# Patient Record
Sex: MALE | Race: BLACK OR AFRICAN AMERICAN | HISPANIC OR LATINO | Employment: UNEMPLOYED | URBAN - METROPOLITAN AREA
[De-identification: names, ages, dates, MRNs, and addresses within clinical notes are randomized per-mention and may not be internally consistent; named-entity substitution may affect disease eponyms.]

---

## 2023-07-15 ENCOUNTER — OFFICE VISIT (OUTPATIENT)
Dept: URGENT CARE | Age: 23
End: 2023-07-15
Payer: COMMERCIAL

## 2023-07-15 VITALS
HEART RATE: 72 BPM | TEMPERATURE: 97 F | OXYGEN SATURATION: 97 % | WEIGHT: 275.2 LBS | RESPIRATION RATE: 18 BRPM | SYSTOLIC BLOOD PRESSURE: 134 MMHG | BODY MASS INDEX: 37.27 KG/M2 | HEIGHT: 72 IN | DIASTOLIC BLOOD PRESSURE: 82 MMHG

## 2023-07-15 DIAGNOSIS — Z02.4 DRIVER'S PERMIT PE (PHYSICAL EXAMINATION): Primary | ICD-10-CM

## 2023-07-15 NOTE — PATIENT INSTRUCTIONS
Cleared for driving permit physical today, all paper work was filled out, signed and copied. Original papers given back to pt today.  Pts ID was verified by me at time of exam.

## 2023-07-15 NOTE — PROGRESS NOTES
NAME: Eva Morton is a 21 y.o. male  : 2000    MRN: [id number]    /82 (BP Location: Right arm, Patient Position: Sitting)   Pulse 72   Temp (!) 97 °F (36.1 °C) (Tympanic)   Resp 18   Ht 6' (1.829 m)   Wt 125 kg (275 lb 3.2 oz)   SpO2 97%   BMI 37.32 kg/m²     1:05 PM    Assessment and Plan   's permit PE (physical examination) [Z02.4]  1. 's permit PE (physical examination)            Lola Marquez was seen today for annual exam.    Diagnoses and all orders for this visit:    's permit PE (physical examination)        Patient Instructions   Patient Instructions   Cleared for driving permit physical today, all paper work was filled out, signed and copied. Original papers given back to pt today. Pts ID was verified by me at time of exam.         Proceed to the nearest ER if symptoms worsen, Follow up with your PCP  Continue to social distance, wash your hands, and wear your masks. Please continue to follow the CDC. gov guidelines daily for they are subject to change on COVID-19    Chief Complaint     Chief Complaint   Patient presents with   • Annual Exam     Drivers Permit Physical.  No complaints. History of Present Illness     69-year-old male here today for 's permit physical.  Patient is [place]-speaking mainly. Patient denies having any chronic or acute illnesses denies taking any prescription medications from a doctor or provider, denies taking any medications over-the-counter or from any herbal medications. Patient denies any history of suicidal ideation or homicidal ideation and denies any history of anxiety and depression. Patient also denies drinking alcohol smoking or doing drugs. Review of Systems   Review of Systems   Constitutional: Negative. HENT: Negative. Eyes: Negative. Respiratory: Negative. Cardiovascular: Negative. Gastrointestinal: Negative. Endocrine: Negative. Genitourinary: Negative.     Musculoskeletal: Negative. Skin: Negative. Allergic/Immunologic: Negative. Neurological: Negative. Hematological: Negative. Psychiatric/Behavioral: Negative. All other systems reviewed and are negative. Current Medications     No current outpatient medications on file. Current Allergies     Allergies as of 07/15/2023   • (No Known Allergies)              History reviewed. No pertinent past medical history. History reviewed. No pertinent surgical history. History reviewed. No pertinent family history. Medications have been verified. The following portions of the patient's history were reviewed and updated as appropriate: allergies, current medications, past family history, past medical history, past social history, past surgical history and problem list.    Objective   /82 (BP Location: Right arm, Patient Position: Sitting)   Pulse 72   Temp (!) 97 °F (36.1 °C) (Tympanic)   Resp 18   Ht 6' (1.829 m)   Wt 125 kg (275 lb 3.2 oz)   SpO2 97%   BMI 37.32 kg/m²      Physical Exam     Physical Exam  Constitutional:       General: He is not in acute distress. Appearance: Normal appearance. He is well-developed. HENT:      Head: Normocephalic. Right Ear: Hearing, tympanic membrane, ear canal and external ear normal.      Left Ear: Hearing, tympanic membrane, ear canal and external ear normal.      Nose: Nose normal.      Mouth/Throat:      Pharynx: Uvula midline. Tonsils: No tonsillar exudate. 0 on the right. 0 on the left. Eyes:      General: Lids are normal.      Conjunctiva/sclera: Conjunctivae normal.      Pupils: Pupils are equal, round, and reactive to light. Neck:      Thyroid: No thyroid mass. Trachea: Trachea normal.   Cardiovascular:      Rate and Rhythm: Normal rate and regular rhythm. Pulses: Normal pulses. Heart sounds: Normal heart sounds. No murmur heard. No friction rub.    Pulmonary:      Effort: Pulmonary effort is normal. Breath sounds: Normal breath sounds. Abdominal:      General: Bowel sounds are normal.      Palpations: Abdomen is soft. Abdomen is not rigid. Tenderness: There is no abdominal tenderness. Musculoskeletal:         General: No deformity. Normal range of motion. Cervical back: Full passive range of motion without pain, normal range of motion and neck supple. Lymphadenopathy:      Cervical: No cervical adenopathy. Skin:     General: Skin is warm. Findings: No rash. Neurological:      Mental Status: He is alert and oriented to person, place, and time. He is not disoriented. Psychiatric:         Speech: Speech normal.         Behavior: Behavior normal. Behavior is cooperative. Thought Content: Thought content normal. Thought content does not include homicidal or suicidal ideation. Note: Portions of this record may have been created with voice recognition software. Occasional wrong word or "sound a like" substitutions may have occurred due to the inherent limitations of voice recognition software. Please read the chart carefully and recognize, using context, where substitutions have occurred. FRANCISCO Jensen